# Patient Record
Sex: FEMALE | URBAN - METROPOLITAN AREA
[De-identification: names, ages, dates, MRNs, and addresses within clinical notes are randomized per-mention and may not be internally consistent; named-entity substitution may affect disease eponyms.]

---

## 2020-06-15 ENCOUNTER — IMPORTED ENCOUNTER (OUTPATIENT)
Dept: URBAN - METROPOLITAN AREA CLINIC 31 | Facility: CLINIC | Age: 50
End: 2020-06-15

## 2020-06-15 PROCEDURE — 92310 CONTACT LENS FITTING OU: CPT

## 2020-06-15 PROCEDURE — 92015 DETERMINE REFRACTIVE STATE: CPT

## 2020-06-15 PROCEDURE — 92004 COMPRE OPH EXAM NEW PT 1/>: CPT

## 2020-06-15 NOTE — PATIENT DISCUSSION
1.  Refractive error - update glasses and contact lens rx. Dispense trial distance OU. Order trial mono and MF. To be seen on same day. 2. Return for an appointment in 1 year for comprehensive exam. with Dr. Steph Mendez.

## 2020-07-07 ENCOUNTER — IMPORTED ENCOUNTER (OUTPATIENT)
Dept: URBAN - METROPOLITAN AREA CLINIC 31 | Facility: CLINIC | Age: 50
End: 2020-07-07

## 2020-07-07 NOTE — PATIENT DISCUSSION
1.  Good fit VA and comfort with Ultra MFs. Prefers to mono. Dispense as DW X 1M. To DC and call if any problems. Can order if satisfied. 2. Return for an appointment in 1 year for comprehensive exam. with Dr. Keaton Swann.

## 2020-08-06 ENCOUNTER — IMPORTED ENCOUNTER (OUTPATIENT)
Dept: URBAN - METROPOLITAN AREA CLINIC 31 | Facility: CLINIC | Age: 50
End: 2020-08-06

## 2020-08-06 NOTE — PATIENT DISCUSSION
1.  Ultra MF distance VA was problematic. Best VA and comfort today with Air Optix MF so dispense as DW X 1M. Can order if satisfied. Felt off balance with Biofinity MFs. 2.  Return for an appointment in 1 year for comprehensive exam. with Dr. Keaton Swann.

## 2022-02-07 ENCOUNTER — IMPORTED ENCOUNTER (OUTPATIENT)
Dept: URBAN - METROPOLITAN AREA CLINIC 31 | Facility: CLINIC | Age: 52
End: 2022-02-07

## 2022-02-07 PROCEDURE — 92015 DETERMINE REFRACTIVE STATE: CPT

## 2022-02-07 PROCEDURE — 99214 OFFICE O/P EST MOD 30 MIN: CPT

## 2022-02-07 PROCEDURE — 92310 CONTACT LENS FITTING OU: CPT

## 2022-02-07 NOTE — PATIENT DISCUSSION
1.  Refractive error - Change glasses. Unsuccessful with MF CLs so has been mostly wearing glasses. Continue with SV distance. 2. Return for an appointment in 1 year for comprehensive exam. with Dr. Damien Ahuja

## 2022-04-01 ASSESSMENT — VISUAL ACUITY
OS_PH: SC 20/80 -1
OU_CC: 20/400
OS_CC: CF@6''
OU_SC: J8
OU_SC: 20/20
OU_SC: 20/20-2
OU_CC: J1+-1
OS_CC: 20/60
OD_SC: 20/20-2
OD_SC: 20/20
OD_CC: 20/400
OS_SC: 20/20
OS_SC: 20/20
OD_PH: SC 20/80 +1
OD_SC: 20/20
OD_CC: 20/25
OS_SC: 20/20-2

## 2022-04-01 ASSESSMENT — TONOMETRY
OD_IOP_MMHG: 14
OS_IOP_MMHG: 15
OS_IOP_MMHG: 16
OD_IOP_MMHG: 15

## 2023-06-05 ENCOUNTER — COMPREHENSIVE EXAM (OUTPATIENT)
Dept: URBAN - METROPOLITAN AREA CLINIC 29 | Facility: CLINIC | Age: 53
End: 2023-06-05

## 2023-06-05 DIAGNOSIS — H52.13: ICD-10-CM

## 2023-06-05 DIAGNOSIS — H52.4: ICD-10-CM

## 2023-06-05 PROCEDURE — 92015 DETERMINE REFRACTIVE STATE: CPT

## 2023-06-05 PROCEDURE — 92014 COMPRE OPH EXAM EST PT 1/>: CPT

## 2023-06-05 PROCEDURE — 92310-2 LEVEL 2 CONTACT LENS MANAGEMENT

## 2023-06-05 ASSESSMENT — VISUAL ACUITY
OS_CC: 20/20-1
OD_CC: 20/20-1

## 2023-06-05 ASSESSMENT — TONOMETRY
OS_IOP_MMHG: 10
OD_IOP_MMHG: 11

## 2023-11-15 ENCOUNTER — CONTACT LENSES/GLASSES VISIT (OUTPATIENT)
Dept: URBAN - METROPOLITAN AREA CLINIC 29 | Facility: CLINIC | Age: 53
End: 2023-11-15

## 2023-11-15 DIAGNOSIS — H52.13: ICD-10-CM

## 2023-11-15 DIAGNOSIS — H52.4: ICD-10-CM

## 2023-11-15 PROCEDURE — 92015GRNC REFRACTION GLASSES RECHECK NO CHARGE

## 2023-11-15 ASSESSMENT — VISUAL ACUITY
OD_CC: 20/25
OS_CC: 20/20
OS_CC: 20/40
OD_CC: 20/20

## 2025-05-06 ENCOUNTER — NEW PATIENT (OUTPATIENT)
Age: 55
End: 2025-05-06

## 2025-05-06 DIAGNOSIS — H52.4: ICD-10-CM

## 2025-05-06 DIAGNOSIS — H52.13: ICD-10-CM

## 2025-05-06 PROCEDURE — 92310-3 LEVEL 3 SOFT LENS UPDATE: Mod: 21

## 2025-05-06 PROCEDURE — 92015 DETERMINE REFRACTIVE STATE: CPT

## 2025-05-06 PROCEDURE — 92004 COMPRE OPH EXAM NEW PT 1/>: CPT

## 2025-05-14 ENCOUNTER — CONTACT LENSES/GLASSES VISIT (OUTPATIENT)
Age: 55
End: 2025-05-14

## 2025-05-14 DIAGNOSIS — H52.4: ICD-10-CM

## 2025-05-14 DIAGNOSIS — H52.13: ICD-10-CM

## 2025-05-14 PROCEDURE — 92310F
